# Patient Record
Sex: MALE | Race: WHITE | NOT HISPANIC OR LATINO | Employment: UNEMPLOYED | URBAN - METROPOLITAN AREA
[De-identification: names, ages, dates, MRNs, and addresses within clinical notes are randomized per-mention and may not be internally consistent; named-entity substitution may affect disease eponyms.]

---

## 2023-04-20 ENCOUNTER — HOSPITAL ENCOUNTER (EMERGENCY)
Facility: HOSPITAL | Age: 68
End: 2023-04-20
Attending: EMERGENCY MEDICINE
Payer: MEDICAID

## 2023-04-20 VITALS — RESPIRATION RATE: 18 BRPM

## 2023-04-20 DIAGNOSIS — I46.9 CARDIOPULMONARY ARREST: Primary | ICD-10-CM

## 2023-04-20 PROCEDURE — 99284 EMERGENCY DEPT VISIT MOD MDM: CPT

## 2023-04-20 PROCEDURE — 94799 UNLISTED PULMONARY SVC/PX: CPT

## 2023-04-20 PROCEDURE — 92950 HEART/LUNG RESUSCITATION CPR: CPT

## 2023-04-20 NOTE — CODE DOCUMENTATION
68 YOWM brought to ED by JESSICA. Patient in full cardiac arrest. Patient had collasped at his brothers home, and the brother immediately began CPR. Patient intubated at the scene with # 8 ET- tube. Bilateral breath sounds present on arrival. Patient arrived in our ED at 0023. CPR continued. Patient had received 8 doses 1mg epi via left proximal tibia. He was given a dose of epi 1mg at 0026. Patient pronounced  at 0020. Asystole verified in 2 leads

## 2023-04-20 NOTE — ED PROVIDER NOTES
Subjective   History of Present Illness  Deric Perdomo is a 68-year-old white male who presents via [facility] in full arrest.  EMS received a call at 11:26 PM for patient had collapsed.  When they arrived to the scene Mr. Perdomo was found in the bathroom unresponsive.  His brother had been performing CPR.  CPR was continued.  Patient was orally intubated.  IO was established.  Cardiac monitor showed asystole.  Patient was given 4 rounds of epinephrine and approximately 20 minutes of CPR when he had a return of pulse.  Patient was loaded into the truck and transported to the ED.  Mr. Perdomo had a pulse for approximately 10 minutes.  At one point the monitor showed V-fib.  Patient was defibrillated.  Rhythm deteriorated to asystole once again.  Patient was given additional 4 rounds of epi.  He was in asystole on ER arrival.    History provided by:  EMS personnel  History limited by:  Acuity of condition      Review of Systems   Unable to perform ROS: Acuity of condition       No past medical history on file.    Not on File    No past surgical history on file.    No family history on file.    Social History     Socioeconomic History   • Marital status:            Objective   Physical Exam  Vitals and nursing note reviewed.   Constitutional:       Comments: 68-year-old white male lying in bed.  CPR is ongoing.  Patient is orally intubated.  The monitor shows asystole.   HENT:      Head: Normocephalic.        Nose: Nose normal.   Eyes:      Comments: Pupils are fixed.  Unresponsive to light.  No corneal reflex.   Cardiovascular:      Comments: No heart sounds.  Pulmonary:      Comments: Breath sounds bilaterally when being bagged.  Apneic.    Abdominal:      Palpations: Abdomen is soft.   Skin:     Coloration: Skin is pale.   Neurological:      Comments: No neurologic response to external stimuli         Procedures       CPR continued.  Patient given 1 additional epinephrine.  After additional 2 to 3  minutes of compression patient still in asystole.  No palpable pulse.  No respiratory effort.  For further efforts at this point are futile.    ED Course  ED Course as of 23 0157   Thu 2023   0033 Patient has been in asystole over 10 minutes on ED arrival.  He was given 1 additional epinephrine.  CPR was continued.  Unfortunately at the end of CPR cycle patient was still in asystole.  No pupillary response.  No corneal reflex.  No heart sounds.  No respirations.  Code was called at 12:29 AM [SS]      ED Course User Index  [SS] Chetan Sage MD                                           [facility]    Final diagnoses:   Cardiopulmonary arrest       ED Disposition  ED Disposition     ED Disposition       Condition   --    Comment   --             No follow-up provider specified.       Medication List      No changes were made to your prescriptions during this visit.          Chetan Sage MD  23 0157